# Patient Record
Sex: FEMALE | URBAN - METROPOLITAN AREA
[De-identification: names, ages, dates, MRNs, and addresses within clinical notes are randomized per-mention and may not be internally consistent; named-entity substitution may affect disease eponyms.]

---

## 2023-05-10 ENCOUNTER — APPOINTMENT (RX ONLY)
Dept: URBAN - METROPOLITAN AREA CLINIC 38 | Facility: CLINIC | Age: 53
Setting detail: DERMATOLOGY
End: 2023-05-10

## 2023-05-10 DIAGNOSIS — T88.7XX: ICD-10-CM | Status: INADEQUATELY CONTROLLED

## 2023-05-10 PROBLEM — T88.7XXA UNSPECIFIED ADVERSE EFFECT OF DRUG OR MEDICAMENT, INITIAL ENCOUNTER: Status: ACTIVE | Noted: 2023-05-10

## 2023-05-10 PROCEDURE — ? TREATMENT REGIMEN

## 2023-05-10 PROCEDURE — ? ADDITIONAL NOTES

## 2023-05-10 PROCEDURE — ? PRESCRIPTION

## 2023-05-10 PROCEDURE — ? COUNSELING

## 2023-05-10 PROCEDURE — 99203 OFFICE O/P NEW LOW 30 MIN: CPT

## 2023-05-10 RX ORDER — TRIAMCINOLONE ACETONIDE 1 MG/G
CREAM TOPICAL BID
Qty: 454 | Refills: 1 | Status: ERX | COMMUNITY
Start: 2023-05-10

## 2023-05-10 RX ADMIN — TRIAMCINOLONE ACETONIDE: 1 CREAM TOPICAL at 00:00

## 2023-05-10 ASSESSMENT — LOCATION DETAILED DESCRIPTION DERM
LOCATION DETAILED: RIGHT DISTAL POSTERIOR UPPER ARM
LOCATION DETAILED: LEFT DISTAL POSTERIOR UPPER ARM
LOCATION DETAILED: MIDDLE STERNUM

## 2023-05-10 ASSESSMENT — LOCATION ZONE DERM
LOCATION ZONE: TRUNK
LOCATION ZONE: ARM

## 2023-05-10 ASSESSMENT — LOCATION SIMPLE DESCRIPTION DERM
LOCATION SIMPLE: CHEST
LOCATION SIMPLE: LEFT POSTERIOR UPPER ARM
LOCATION SIMPLE: RIGHT POSTERIOR UPPER ARM

## 2023-05-10 NOTE — HPI: RASH
What Type Of Note Output Would You Prefer (Optional)?: Standard Output
How Severe Is Your Rash?: moderate
Is This A New Presentation, Or A Follow-Up?: Rash
Additional History: Pt stated she was prescribed pills from her allergist which slightly helps with the itching.

## 2023-05-10 NOTE — PROCEDURE: MIPS QUALITY
FYI  
Quality 431: Preventive Care And Screening: Unhealthy Alcohol Use - Screening: Patient not identified as an unhealthy alcohol user when screened for unhealthy alcohol use using a systematic screening method
Detail Level: Detailed
Quality 226: Preventive Care And Screening: Tobacco Use: Screening And Cessation Intervention: Patient screened for tobacco use and is an ex/non-smoker
Quality 130: Documentation Of Current Medications In The Medical Record: Current Medications Documented